# Patient Record
Sex: MALE | Race: BLACK OR AFRICAN AMERICAN | NOT HISPANIC OR LATINO | URBAN - METROPOLITAN AREA
[De-identification: names, ages, dates, MRNs, and addresses within clinical notes are randomized per-mention and may not be internally consistent; named-entity substitution may affect disease eponyms.]

---

## 2023-08-22 ENCOUNTER — EMERGENCY (EMERGENCY)
Facility: HOSPITAL | Age: 48
LOS: 1 days | Discharge: HOME | End: 2023-08-22
Attending: EMERGENCY MEDICINE
Payer: SELF-PAY

## 2023-08-22 VITALS
WEIGHT: 160.06 LBS | RESPIRATION RATE: 18 BRPM | TEMPERATURE: 100 F | SYSTOLIC BLOOD PRESSURE: 137 MMHG | HEIGHT: 64 IN | DIASTOLIC BLOOD PRESSURE: 94 MMHG | OXYGEN SATURATION: 99 % | HEART RATE: 72 BPM

## 2023-08-22 VITALS
OXYGEN SATURATION: 99 % | RESPIRATION RATE: 16 BRPM | DIASTOLIC BLOOD PRESSURE: 88 MMHG | SYSTOLIC BLOOD PRESSURE: 140 MMHG | TEMPERATURE: 100 F | HEART RATE: 70 BPM

## 2023-08-22 PROCEDURE — 99284 EMERGENCY DEPT VISIT MOD MDM: CPT | Mod: 25

## 2023-08-22 PROCEDURE — 41800 DRAINAGE OF GUM LESION: CPT

## 2023-08-22 PROCEDURE — 99284 EMERGENCY DEPT VISIT MOD MDM: CPT

## 2023-08-22 RX ORDER — IBUPROFEN 200 MG
600 TABLET ORAL ONCE
Refills: 0 | Status: COMPLETED | OUTPATIENT
Start: 2023-08-22 | End: 2023-08-22

## 2023-08-22 RX ADMIN — Medication 600 MILLIGRAM(S): at 20:09

## 2023-08-22 RX ADMIN — Medication 1 TABLET(S): at 20:09

## 2023-08-22 NOTE — ED PROVIDER NOTE - PATIENT PORTAL LINK FT
You can access the FollowMyHealth Patient Portal offered by [facility] by registering at the following website: [email or web address]. By joining [employer]’s FollowMyHealth portal, you will also be able to view your health information using other applications (apps) compatible with our system.

## 2023-08-22 NOTE — ED ADULT NURSE NOTE - NSFALLUNIVINTERV_ED_ALL_ED
Bed/Stretcher in lowest position, wheels locked, appropriate side rails in place/Call bell, personal items and telephone in reach/Instruct patient to call for assistance before getting out of bed/chair/stretcher/Non-slip footwear applied when patient is off stretcher/[place] to call system/Physically safe environment - no spills, clutter or unnecessary equipment/Purposeful proactive rounding/Room/bathroom lighting operational, light cord in reach

## 2023-08-22 NOTE — CONSULT NOTE ADULT - SUBJECTIVE AND OBJECTIVE BOX
Patient is a 47y old make who presents with a chief complaint of tooth pain in LL. Dental consulted to assess tooth pain.    HPI: RCT completed 10 years ago followed by post placement. Patient did not go back to the dentist for restoration. Severe pain started yesterday, and patient noticed swelling this morning.    PAST MEDICAL & SURGICAL HISTORY: None      MEDICATIONS (STANDING): None    MEDICATIONS  (PRN): None      Allergies  No Known Allergies      EOE:  TMJ ( - ) clicks                   ( -  ) pops                   ( - ) crepitus            Mandible: limited ROM, opening 20mm            Facial bones and MOM: grossly intact            ( - ) trismus            ( - ) LAD            ( + ) swelling: below angle of left mandible            ( + ) asymmetry: area around upper right SCM slightly enlarged            ( + ) palpation: below angle of left mandible            ( + ) SOB            ( - ) dysphagia            ( - ) LOC    IOE:  permanent dentition: grossly intact           hard/soft palate: ( - ) palatal torus          tongue/FOM: WNL          labial/buccal mucosa: WNL          ( + ) percussion: #18          ( + ) palpation: pain on buccal gingiva of #18          ( + ) swelling: LL vestibule apical to #18  Mobility: none    *DENTAL RADIOGRAPHS: PA and Hall taken and interpreted   #18: RCT and post present and PARL at the apex of #18      ASSESSMENT: #18 PARL associated w/ fluctuant vestibular swellings. Swelling below the angle of left mandible and left upper SCM due to odontogenic infection.    Procedure:  Limited clinical and radiographic exam completed w/ patient’s verbal consent. Discussed findings w/ patient. Recommended I&D. Patient agreed to the treatment and written consent form was signed by patient. Discussed with patient about procedure. RBA of tx reviewed, all questions answered. Applied 18% topical benzocaine to both soft tissue sites. Administered 1.5 carpule of 2% lidocaine w/ 1:100,000 epi via infiltrations w/ carpule and needle change between each injection to lower left vestibules. Incised to bone distal to site tooth #18 w/ 15 blade. Fascial planes blunt dissected using a hemostat. Hemopurulence appreciated. Monojet syringe was then used with saline to irrigate incisional sites. Once completed, patient was given post op instructions verbally as well as written. Penrose drain placed only on the vestibular site of #18 with 3-0 chromic suture via simple interrupted. Prior to patient dismissal from dental ED, hemostasis was achieved, and patient reported that relief was felt. Patient tolerated procedure well and all patient’s questions and concerns were addressed.    RECOMMENDATIONS:   1) discharge on oral antibiotics and pain meds per ED Team  2) f/u w/ [facility] dental clinic in 2-3 days for drain removal [phone]  3) Dental F/U with outpatient dentist for #18 and comprehensive dental care, or [facility] upon discharge for comprehensive dental care ([phone]).    Dental residents:   Laisha Espinal, #[id number]  Miguel Malcolm, #[id number]

## 2023-08-22 NOTE — ED PROVIDER NOTE - NSFOLLOWUPINSTRUCTIONS_ED_ALL_ED_FT
- stay hydrated.   -take all home medications as prescribed  - take tylenol 975mg and ibuprofen 600mg every 6 hours as needed for pain-take with meals.  - follow up with the dental clinic as scheduled  -take antibiotics as prescribed    - return if symptoms worsen, worsening pain/swelling, difficulty swallowing or breathing and all other concerns.

## 2023-08-22 NOTE — ED PROVIDER NOTE - NS ED ATTENDING STATEMENT MOD
This was a shared visit with the JEYSON. I reviewed and verified the documentation and independently performed the documented:

## 2023-08-22 NOTE — ED PROVIDER NOTE - CLINICAL SUMMARY MEDICAL DECISION MAKING FREE TEXT BOX
Cindy: 47 year old male with no pmhx here with left lower molar pain x 3 days.  applying orajel to the area with no improvement.  subjective fevers.  will get dental consult. reasses.  Patient with left lower molar and premolar swelling/fluctuance to the gum, + ttp with percusision to tooth, nooverly erythema. will consult dental for periapical abscess. oral abx, reassess.

## 2023-08-22 NOTE — ED PROVIDER NOTE - OBJECTIVE STATEMENT
47-year-old male with no past medical history here for evaluation of left lower molar pain for the past 3 days.  Patient states he has been applying Orajel to the area without improvement in the pain, is unable to chew secondary to the pain.  He endorses subjective fevers since yesterday.  Has not taken anything else for the pain.  He denies any sore throat, sinus congestion, cough, shortness of no difficulty swallowing or breathing.  No throat swelling.  Patient states 7 to 10 years ago  he had a root canal to those teeth and is awaiting crown placement however his teeth have not bothered him until 3 days ago.  Last time he saw his dentist was 2 months ago and who recommended crown placement.

## 2023-08-22 NOTE — ED PROVIDER NOTE - NSFOLLOWUPCLINICS_GEN_ALL_ED_FT
He is being referred to Behavioral Health for counseling.   [facility]  Dental  [address] [place], [place] [id number]  Phone: [phone]  Fax:   Follow Up Time: 1-3 Days

## 2023-08-22 NOTE — ED PROVIDER NOTE - PHYSICAL EXAMINATION
A&Ox3, NAD, well appearing  NCAT. PERRL, EOMI.  +swelling and fluctuance to the gum of the L lower molar and premolar with +ttp with percussion to the tooth. No overlaying erythema.  Neck supple, no LAD. No submandibular swelling or ttp.   No focal Deficits

## 2023-08-22 NOTE — ED ADULT TRIAGE NOTE - BSA (M2)
January 3, 2020      Mao Nix  [address]  [facility] [place] [address]        Dear ,    We are writing to inform you of your test results.    Your test results fall within the expected range(s) or remain unchanged from previous results.  Please continue with current treatment plan.    Resulted Orders   NEISSERIA GONORRHOEA PCR   Result Value Ref Range    Specimen Descrip Urine     N Gonorrhea PCR Negative NEG^Negative      Comment:      Negative for N. gonorrhoeae rRNA by transcription mediated amplification.  A negative result by transcription mediated amplification does not preclude   the presence of N. gonorrhoeae infection because results are dependent on   proper and adequate collection, absence of inhibitors, and sufficient rRNA to   be detected.     CHLAMYDIA TRACHOMATIS PCR   Result Value Ref Range    Specimen Description Urine     Chlamydia Trachomatis PCR Negative NEG^Negative      Comment:      Negative for C. trachomatis rRNA by transcription mediated amplification.  A negative result by transcription mediated amplification does not preclude   the presence of C. trachomatis infection because results are dependent on   proper and adequate collection, absence of inhibitors, and sufficient rRNA to   be detected.         If you have any questions or concerns, please call the clinic at the number listed above.       Sincerely,        Mariah Mccann MD                
1.78

## 2023-08-22 NOTE — ED ADULT NURSE NOTE - OBJECTIVE STATEMENT
48 y/o M A&Ox4 w/ no significant PMH presents to ED complaining of toothache. Pt states that lower left tooth pain (approximately molar) began yesterday. Pt states that he has increased difficulty eating due to pain. Pt endorses headache and fever starting today. Pt denies any dental procedures or trauma to the jaw. Pt states he tried orajel with no relief.

## 2023-08-23 ENCOUNTER — EMERGENCY (EMERGENCY)
Facility: HOSPITAL | Age: 48
LOS: 1 days | Discharge: HOME | End: 2023-08-23
Attending: EMERGENCY MEDICINE
Payer: MEDICAID

## 2023-08-23 VITALS
WEIGHT: 149.91 LBS | RESPIRATION RATE: 18 BRPM | OXYGEN SATURATION: 99 % | DIASTOLIC BLOOD PRESSURE: 77 MMHG | HEART RATE: 73 BPM | HEIGHT: 64 IN | SYSTOLIC BLOOD PRESSURE: 106 MMHG | TEMPERATURE: 99 F

## 2023-08-23 PROCEDURE — 99284 EMERGENCY DEPT VISIT MOD MDM: CPT

## 2023-08-23 PROCEDURE — 99283 EMERGENCY DEPT VISIT LOW MDM: CPT

## 2023-08-23 RX ORDER — ACETAMINOPHEN 500 MG
975 TABLET ORAL ONCE
Refills: 0 | Status: COMPLETED | OUTPATIENT
Start: 2023-08-23 | End: 2023-08-23

## 2023-08-23 RX ADMIN — Medication 1 TABLET(S): at 14:29

## 2023-08-23 RX ADMIN — Medication 975 MILLIGRAM(S): at 14:28

## 2023-08-23 NOTE — ED PROVIDER NOTE - NSFOLLOWUPINSTRUCTIONS_ED_ALL_ED_FT
Please go to your dental clinic appointment tomorrow 8/24/23; phone number is [phone]    PLEASE TAKE YOUR ANTIBIOTIC AS INSTRUCTED.    Follow up with your primary care doctor in 2-3 days or call internal medicine clinic at [phone] for first available appointment in 1 week.      Return to ER for worsening or severe symptoms, persistent vomiting, chest pain, shortness of breath.

## 2023-08-23 NOTE — ED PROVIDER NOTE - PATIENT PORTAL LINK FT
You can access the FollowMyHealth Patient Portal offered by [facility] by registering at the following website: [email or web address]. By joining [employer]’s FollowMyHealth portal, you will also be able to view your health information using other applications (apps) compatible with our system.

## 2023-08-23 NOTE — ED PROVIDER NOTE - PHYSICAL EXAMINATION
GEN: Pt in NAD, A&O x3. GCS 15  EYES: Sclera white w/o injection, PERRLA, EOMI.  ENT: Head NCAT. Nose without deformity, turbinates without edema or erythema, no DC. No auricular TTP. Mouth and pharynx without erythema or exudates, uvula midline, no tonsillar enlargement. Neck supple FROM.   RESP: No chest wall tenderness, CTA b/l, no wheezes, rales, or rhonchi.   CARDIAC: RRR, clear distinct S1, S2, no murmurs, gallops, or rubs.   ABD: Abdomen non-distended, soft, non-tender, no rebound or guarding. No CVAT b/l.  VASC: 2+ carotid, radial, and dorsalis pedis pulses b/l. No edema or tenderness of the lower extremities.  NEURO: CN 2-12 grossly intact. Normal and equal sensation UE, LE and face b/l. 5/5 strength UE and LE b/l.  Pronator drift negative. Normal gait and gross cerebellar functioning.  MSK: No joint erythema or obvious deformities. Spine without obvious deformity. No midline or paraspinal tenderness. FROM w/o pain of upper and lower extremities b/l.  SKIN: No rashes noted.  OMFS: LEFT lower gingiva swollen, tender to palpation. No area of palpable fluctuance. Tenderness along tooth 18.

## 2023-08-23 NOTE — ED PROVIDER NOTE - CLINICAL SUMMARY MEDICAL DECISION MAKING FREE TEXT BOX
Patient presents to emergency department for left-sided tooth ache of #18 and concerned that the Penrose is falling out.  Patient states that patient does not have any purulent drainage so far.  Advised to control pain with acetaminophen and Tylenol.  Dental team was consulted who advised that the drain is in place given that it is sutured in but was mildly displaced.  Patient counseled to continue using ice packs and continue taking the Augmentin antibiotic.  Patient counseled to return tomorrow to dental clinic.

## 2023-08-23 NOTE — ED PROVIDER NOTE - OBJECTIVE STATEMENT
47-year-old man with past no past medical history presents to emergency department for left-sided tooth ache of tooth #18 that has been occurring for 3 days.  Patient was seen in the emergency room last night approximately 13 hours ago where the dental team was consulted.  Patient had a Penrose placed in the ER and will discharge with dental follow-up, however patient returns today because the Penrose fell out by itself. No fever, chills, nausea, vomiting, or diaphoresis.

## 2023-09-01 ENCOUNTER — APPOINTMENT (OUTPATIENT)
Age: 48
End: 2023-09-01

## 2023-12-11 PROBLEM — Z00.00 ENCOUNTER FOR PREVENTIVE HEALTH EXAMINATION: Status: ACTIVE | Noted: 2023-12-11
